# Patient Record
Sex: MALE | Race: WHITE | NOT HISPANIC OR LATINO | Employment: FULL TIME | URBAN - METROPOLITAN AREA
[De-identification: names, ages, dates, MRNs, and addresses within clinical notes are randomized per-mention and may not be internally consistent; named-entity substitution may affect disease eponyms.]

---

## 2024-01-15 ENCOUNTER — OFFICE VISIT (OUTPATIENT)
Dept: PRIMARY CARE | Facility: CLINIC | Age: 39
End: 2024-01-15
Payer: COMMERCIAL

## 2024-01-15 VITALS
SYSTOLIC BLOOD PRESSURE: 121 MMHG | BODY MASS INDEX: 27.89 KG/M2 | WEIGHT: 184 LBS | HEART RATE: 76 BPM | HEIGHT: 68 IN | DIASTOLIC BLOOD PRESSURE: 69 MMHG

## 2024-01-15 DIAGNOSIS — G47.00 INSOMNIA, UNSPECIFIED TYPE: ICD-10-CM

## 2024-01-15 DIAGNOSIS — M25.511 CHRONIC RIGHT SHOULDER PAIN: ICD-10-CM

## 2024-01-15 DIAGNOSIS — G89.29 CHRONIC RIGHT SHOULDER PAIN: ICD-10-CM

## 2024-01-15 DIAGNOSIS — J30.9 ALLERGIC RHINITIS, UNSPECIFIED SEASONALITY, UNSPECIFIED TRIGGER: ICD-10-CM

## 2024-01-15 DIAGNOSIS — F41.8 DEPRESSION WITH ANXIETY: ICD-10-CM

## 2024-01-15 DIAGNOSIS — Z00.00 ANNUAL PHYSICAL EXAM: Primary | ICD-10-CM

## 2024-01-15 PROCEDURE — 1036F TOBACCO NON-USER: CPT | Performed by: INTERNAL MEDICINE

## 2024-01-15 PROCEDURE — 99386 PREV VISIT NEW AGE 40-64: CPT | Performed by: INTERNAL MEDICINE

## 2024-01-15 RX ORDER — HYDROXYZINE HYDROCHLORIDE 25 MG/1
25 TABLET, FILM COATED ORAL NIGHTLY PRN
Qty: 30 TABLET | Refills: 1 | Status: SHIPPED | OUTPATIENT
Start: 2024-01-15 | End: 2024-03-15

## 2024-01-15 ASSESSMENT — ENCOUNTER SYMPTOMS
DEPRESSION: 0
LOSS OF SENSATION IN FEET: 0
OCCASIONAL FEELINGS OF UNSTEADINESS: 0

## 2024-01-15 NOTE — PROGRESS NOTES
"Subjective   Patient ID: Keanu Ackerman is a 38 y.o. male who presents for Annual Exam.    HPI   Patient is a 38-year-old  male who comes to establish primary care.  He would like to get an annual wellness exam and lab work.  Patient is currently not on any regular prescription medications.  He used to see psychiatry as well as psychology for his history of depression and anxiety for which she was taking Lexapro but has been off the medication for quite some time because he did not like the way it made him feel and he currently denies feeling particularly depressed or anxious.  Patient has been experiencing right shoulder pain on and off for the past few years-symptom initially started while he was playing any basketball league.  Patient has not had any imaging or orthopedic evaluation in this regard.  He does have history of allergies for which she takes over-the-counter antihistamine as needed  Last Tdap was less than 10 years ago.  Patient denies any significant family history of premature CAD or malignancy.  Pt denies, fever, chills, CP, SOB, abdominal pain, N/V/D/C or  symptoms. No HA, dizziness, numbness or weakness.  No loss of weight, loss of appetite, melena or rectal bleeding.  Patient lives with his wife and 4-year-old son.  He complains of difficulty sleeping and wakes up many times per night is agreeable to trying medication to help with this issue.  Review of Systems  As per HPI.  Patient does not do any regular exercise activity.  Mother  of leukemia at age 65. No other Fhx of malIgnancy.  Objective   /69 (BP Location: Right arm, Patient Position: Sitting, BP Cuff Size: Adult long)   Pulse 76   Ht 1.727 m (5' 8\")   Wt 83.5 kg (184 lb)   BMI 27.98 kg/m²     Physical Exam  General - well developed, well appearing, overweight, young  male in no acute respiratory distress  Eyes - normal sclera and conjunctiva with no pallor or icterus, normal extraocular movements  ENT - " normal external auditory canals and tympanic membranes, throat clear with no exudates  Neck - No JVD, thyromegaly or lymphadenopathy  Lungs - no respiratory distress and lungs clear to auscultation bilaterally  Heart - normal S1, S2 with normal heart rate, rhythm and no murmurs   Abdomen - soft, nontender with no masses or organomegaly  Extremities - no cyanosis or pedal edema  Neuro - grossly normal neuro exam with no focal neuro deficits  Psych - normal mental status, mood and affect   Skin - no rashes or ulcers  MSK - normal gait with grossly normal ROM of major joints  Assessment/Plan        1.  Annual wellness exam-routine labs ordered, patient claims he is up-to-date on immunizations  2.  History of allergic rhinitis-patient takes over-the-counter antihistamine.  3.  Chronic right shoulder pain-referral provided to orthopedics as well as physical therapy  4.  Depression with anxiety-patient does not believe he needs any regular drug treatment for this issue because his symptoms have improved and he has learned to deal with situations that trigger symptoms  5.  Chronic insomnia-hydroxyzine 25 mg nightly as needed has been prescribed  Follow-up in 3 to 6 months.  34 minutes spent rooming the patient, reviewing records, eliciting history, examining patient, counseling, coordination of care and in documentation.  This note was partially generated using the Dragon voice recognition system. There may be some incorrect words, spelling and punctuation errors that were not corrected prior to committing the note to the patient's medical record.

## 2024-01-30 ENCOUNTER — EVALUATION (OUTPATIENT)
Dept: PHYSICAL THERAPY | Facility: CLINIC | Age: 39
End: 2024-01-30
Payer: COMMERCIAL

## 2024-01-30 DIAGNOSIS — M25.511 CHRONIC RIGHT SHOULDER PAIN: Primary | ICD-10-CM

## 2024-01-30 DIAGNOSIS — G89.29 CHRONIC RIGHT SHOULDER PAIN: Primary | ICD-10-CM

## 2024-01-30 PROCEDURE — 97110 THERAPEUTIC EXERCISES: CPT | Mod: GP | Performed by: PHYSICAL THERAPIST

## 2024-01-30 PROCEDURE — 97161 PT EVAL LOW COMPLEX 20 MIN: CPT | Mod: GP | Performed by: PHYSICAL THERAPIST

## 2024-01-30 ASSESSMENT — ENCOUNTER SYMPTOMS
LOSS OF SENSATION IN FEET: 0
OCCASIONAL FEELINGS OF UNSTEADINESS: 0
DEPRESSION: 0

## 2024-01-30 NOTE — PROGRESS NOTES
"Physical Therapy Evaluation    Patient Name: Keanu Ackerman  MRN: [id number]  Today's Date: 1/30/2024  Visit: 1  Referred by: Dr. Boss  Diagnosis:   1. Chronic right shoulder pain          SUBJECTIVE:  38 y.o. english speaking male with Hx of R) shoulder pain x ~4 yrs.   States he feels it started with playing football and noticed a pop and light ache when he threw the ball.  He was able to continue playing but it ached every time he threw the ball.  It was sore for a few days after.   Then it would ache when he had to work with arms over head.  ~1 yr. Later he was playing in a basketball league and the pain occurred with over head movements.  Eventually he has to stop playing in the league.  Now pain anterior and posterior R) shoulder pain with reaching over head.  Better with activity modification.  Pain: W- 3/10, B- 0/10    Home Living:   Lives in home with wife    Prior level of function:  No limitations prior    OBJECTIVE:  Tight pect minor  Shoulders rounded   Tight in posterior capsule.  4/5 ABD with mild pain R)  4+/5 ER without pain  4/5 mid and lower trap  (+) Huang and Neer impingement  Full AROM of shoulder.  (+) crepitus with ABD and flexion above 90*.  Pain with elevation through flexion above 120 and ABD above 90.  (-) load and shift and drop sign,   (-) Push off subscap.  (+) jobes  Outcome Measure:  SPADI- 20%    ASSESSMENT:  Pt. With mild flexibility and cuff/scapular muscle weakness that needs to addressed to decreased symptoms and improve functional tolerances.    TREATMENT:  - Therex:  Door way low Pect S 30\" x 3  Prone Scap I's 5\"x10  Prone Scap T's  5\"x10  SL ER  (2/5)  1#  2x10  SL ABD (2/5)  2#  2x10    PATIENT EDUCATION:  HEP    PLAN:   Daily HEP   Follow up PT weekly x 8 weeks.  Rehab potential: good  Plan of care agreement: Y    GOALS:  Active       PT Problem       Pt will have improved pain free ROM of shoulder       Start:  01/30/24    Expected End:  04/05/24            Pt will " have improved strength of R) UE to assist with improving functional tolerances       Start:  01/30/24    Expected End:  04/05/24            Pt will have improved SPADI score by at least 15%       Start:  01/30/24    Expected End:  04/05/24            Pt will have improved reports of pain by at least 2 levels using a VAS       Start:  01/30/24    Expected End:  03/01/24            Pt will be independent with HPE       Start:  01/30/24    Expected End:  03/01/24

## 2024-02-01 ENCOUNTER — TELEPHONE (OUTPATIENT)
Dept: PHYSICAL THERAPY | Facility: CLINIC | Age: 39
End: 2024-02-01
Payer: COMMERCIAL

## 2024-02-09 ENCOUNTER — TREATMENT (OUTPATIENT)
Dept: PHYSICAL THERAPY | Facility: CLINIC | Age: 39
End: 2024-02-09
Payer: COMMERCIAL

## 2024-02-09 DIAGNOSIS — M25.511 CHRONIC RIGHT SHOULDER PAIN: Primary | ICD-10-CM

## 2024-02-09 DIAGNOSIS — G89.29 CHRONIC RIGHT SHOULDER PAIN: Primary | ICD-10-CM

## 2024-02-09 PROCEDURE — 97110 THERAPEUTIC EXERCISES: CPT | Mod: GP | Performed by: PHYSICAL THERAPIST

## 2024-02-09 NOTE — PROGRESS NOTES
"Physical Therapy Treatment    Patient Name: Keanu Ackerman  MRN: [id number]  Today's Date: 2/9/2024  Visit: 2  Referred by: Dr. Boss  Diagnosis:   1. Chronic right shoulder pain          SUBJECTIVE:  38 y.o. male who returns for his chronic R) shoulder pain.   He states that his anterior and posterior R) shoulder pain has not changed.  He feels a pop in his shoulder as he raises it above his shoulders and it hurts at that point, but above and below that point it does not hurt.   HEP- Y  Pain: 3-4/10      OBJECTIVE:  Tight pect minor  Tight in posterior capsule.  4/5 ABD with mild pain R)  4+/5 ER without pain  4/5 mid and lower trap  (+) Huang and Neer impingement  Full AROM of shoulder.   Pain with elevation through flexion above 120 and ABD above 90.   (+) crepitus      Outcome Measure:  SPADI- 20%    ASSESSMENT:  Pt with decreased popping after session and able to elevate his shoulder through abduction and flexion without pain.    TREATMENT:  - Therex:  UBE L4  x 5min.  Door way low Pect S 60\" x 2  Table roller flexion S  10\" x 10  Cross body  10\" x 10  Prone Scap I's 5\"x10  1#  Prone Scap T's  5\"x10  1#  SL ER  (2/5)  4#  3x10  SL ABD (2/5)  4#  3x10  TB G  ER walkouts  5\" x 10    Manual therapy-  R) GHJ: inferior glide in multiple angles, posterior glide in multiple angles.    PLAN:   Continue daily HEP  F/U weekly to progress pain free mobility and cuff/scapular strength.    GOALS:  Active       PT Problem       Pt will have improved pain free ROM of shoulder       Start:  01/30/24    Expected End:  04/05/24            Pt will have improved strength of R) UE to assist with improving functional tolerances       Start:  01/30/24    Expected End:  04/05/24            Pt will have improved SPADI score by at least 15%       Start:  01/30/24    Expected End:  04/05/24            Pt will have improved reports of pain by at least 2 levels using a VAS       Start:  01/30/24    Expected End:  03/01/24            Pt " will be independent with HPE       Start:  01/30/24    Expected End:  03/01/24

## 2024-02-23 ENCOUNTER — TREATMENT (OUTPATIENT)
Dept: PHYSICAL THERAPY | Facility: CLINIC | Age: 39
End: 2024-02-23
Payer: COMMERCIAL

## 2024-02-23 DIAGNOSIS — G89.29 CHRONIC RIGHT SHOULDER PAIN: ICD-10-CM

## 2024-02-23 DIAGNOSIS — M25.511 CHRONIC RIGHT SHOULDER PAIN: ICD-10-CM

## 2024-02-23 PROCEDURE — 97110 THERAPEUTIC EXERCISES: CPT | Mod: GP,CQ | Performed by: PHYSICAL THERAPY ASSISTANT

## 2024-02-23 PROCEDURE — 97140 MANUAL THERAPY 1/> REGIONS: CPT | Mod: GP,CQ | Performed by: PHYSICAL THERAPY ASSISTANT

## 2024-02-23 NOTE — PROGRESS NOTES
"Physical Therapy Treatment    Patient Name: Keanu Ackerman  MRN: [id number]  Today's Date: 2/23/2024  Visit: 3  Initial Eval: 1/30/24  Referred by: Dr. Boss  Diagnosis:   1. Chronic right shoulder pain  Follow Up In Physical Therapy        SUBJECTIVE:  Pt enters into therapy reporting no pain at rest. He states he had ortho consultation earlier this weak and the Dr was under the impression that he may have a torn labrum. He does have MRI approved. He has to call to schedule.     Pain: 3-4/10      OBJECTIVE:  Crepitus with S/L long are abd. Decreased load d/t pain.     Tight pect minor  Tight in posterior capsule.  4/5 ABD with mild pain R)  4+/5 ER without pain  4/5 mid and lower trap  (+) Huang and Neer impingement  Full AROM of shoulder.   Pain with elevation through flexion above 120 and ABD above 90.   (+) crepitus      Outcome Measure:  SPADI- 20%    ASSESSMENT:  Responded well to exercises dosage for the most part. Pain remained contained and manageable but intermittently catches with long arc movements. Doing well with HEP.     TREATMENT:  - Therex:  UBE 3'/3', L5  Rows 5# 3x10  Shld ext 2.5# 3x10  Door way low Pect S 60\" x 2  Table roller flexion S  10\" x 10  Cross body  10\" x 10  Prone Scap I's 5\"x10  1#  Prone Scap T's  5\"x10  1#  SL ER  (2/5)  4#  2x10  SL ABD (2/5)  1#  2x10   TB G  ER walkouts  5\" x 10    MANUAL:    PLAN:   Continue daily HEP  F/U weekly to progress pain free mobility and cuff/scapular strength.    GOALS:  Active       PT Problem       Pt will have improved pain free ROM of shoulder       Start:  01/30/24    Expected End:  04/05/24            Pt will have improved strength of R) UE to assist with improving functional tolerances       Start:  01/30/24    Expected End:  04/05/24            Pt will have improved SPADI score by at least 15%       Start:  01/30/24    Expected End:  04/05/24            Pt will have improved reports of pain by at least 2 levels using a VAS       Start:  " 01/30/24    Expected End:  03/01/24            Pt will be independent with HPE       Start:  01/30/24    Expected End:  03/01/24

## 2024-03-11 ENCOUNTER — APPOINTMENT (OUTPATIENT)
Dept: PHYSICAL THERAPY | Facility: CLINIC | Age: 39
End: 2024-03-11
Payer: COMMERCIAL

## 2024-03-18 ENCOUNTER — APPOINTMENT (OUTPATIENT)
Dept: PHYSICAL THERAPY | Facility: CLINIC | Age: 39
End: 2024-03-18
Payer: COMMERCIAL

## 2024-03-25 ENCOUNTER — APPOINTMENT (OUTPATIENT)
Dept: PHYSICAL THERAPY | Facility: CLINIC | Age: 39
End: 2024-03-25
Payer: COMMERCIAL

## 2024-09-25 ENCOUNTER — HOSPITAL ENCOUNTER (OUTPATIENT)
Dept: RADIOLOGY | Facility: CLINIC | Age: 39
Discharge: HOME | End: 2024-09-25
Payer: COMMERCIAL

## 2024-09-25 ENCOUNTER — OFFICE VISIT (OUTPATIENT)
Dept: PRIMARY CARE | Facility: CLINIC | Age: 39
End: 2024-09-25
Payer: COMMERCIAL

## 2024-09-25 VITALS
DIASTOLIC BLOOD PRESSURE: 79 MMHG | WEIGHT: 180 LBS | SYSTOLIC BLOOD PRESSURE: 122 MMHG | HEIGHT: 68 IN | BODY MASS INDEX: 27.28 KG/M2 | HEART RATE: 91 BPM

## 2024-09-25 DIAGNOSIS — R05.1 ACUTE COUGH: ICD-10-CM

## 2024-09-25 DIAGNOSIS — R06.2 WHEEZING: ICD-10-CM

## 2024-09-25 DIAGNOSIS — R05.1 ACUTE COUGH: Primary | ICD-10-CM

## 2024-09-25 DIAGNOSIS — J45.20 MILD INTERMITTENT CHILDHOOD ASTHMA WITHOUT COMPLICATION (HHS-HCC): ICD-10-CM

## 2024-09-25 PROBLEM — R10.9 ABDOMINAL PAIN: Status: RESOLVED | Noted: 2024-09-25 | Resolved: 2024-09-25

## 2024-09-25 PROBLEM — R11.2 NAUSEA AND VOMITING: Status: RESOLVED | Noted: 2024-09-25 | Resolved: 2024-09-25

## 2024-09-25 PROCEDURE — 71046 X-RAY EXAM CHEST 2 VIEWS: CPT

## 2024-09-25 PROCEDURE — 71046 X-RAY EXAM CHEST 2 VIEWS: CPT | Performed by: RADIOLOGY

## 2024-09-25 PROCEDURE — 3008F BODY MASS INDEX DOCD: CPT | Performed by: INTERNAL MEDICINE

## 2024-09-25 PROCEDURE — 99213 OFFICE O/P EST LOW 20 MIN: CPT | Performed by: INTERNAL MEDICINE

## 2024-09-25 PROCEDURE — 1036F TOBACCO NON-USER: CPT | Performed by: INTERNAL MEDICINE

## 2024-09-25 RX ORDER — AZITHROMYCIN 250 MG/1
TABLET, FILM COATED ORAL
Qty: 6 TABLET | Refills: 0 | Status: SHIPPED | OUTPATIENT
Start: 2024-09-25 | End: 2024-09-30

## 2024-09-25 RX ORDER — ALBUTEROL SULFATE 90 UG/1
2 INHALANT RESPIRATORY (INHALATION) EVERY 4 HOURS PRN
Qty: 8 G | Refills: 1 | Status: SHIPPED | OUTPATIENT
Start: 2024-09-25 | End: 2025-09-25

## 2024-09-25 RX ORDER — METHYLPREDNISOLONE 4 MG/1
TABLET ORAL
Qty: 21 TABLET | Refills: 0 | Status: SHIPPED | OUTPATIENT
Start: 2024-09-25 | End: 2024-10-02

## 2024-09-25 RX ORDER — BENZONATATE 100 MG/1
100 CAPSULE ORAL 3 TIMES DAILY PRN
Qty: 30 CAPSULE | Refills: 0 | Status: SHIPPED | OUTPATIENT
Start: 2024-09-25 | End: 2024-10-02

## 2024-09-25 NOTE — PROGRESS NOTES
"Subjective   Patient ID: Keanu Ackerman is a 39 y.o. male who presents for Cough and Wheezing.    JONA Ackerman is a 39-year-old  male who comes today for an acute visit.  Patient complains of more than 1 week history of cough with yellow phlegm production associated with shortness of breath, wheezing and intermittent fever.  He was exposed to a sick contact prior to symptom onset.  Patient does have history of childhood asthma and had an old albuterol inhaler which she has been using but this does not seem to relieve the wheezing/shortness of breath.  Cough was quite severe last night and did interfere with sleep.  No history of chest pain, abdominal pain, nausea, vomiting, diarrhea or genitourinary symptoms.  Review of Systems  As per HPI.  Objective   /79 (BP Location: Right arm, Patient Position: Sitting, BP Cuff Size: Large adult)   Pulse 91   Ht 1.727 m (5' 8\")   Wt 81.6 kg (180 lb)   BMI 27.37 kg/m²     Physical Exam  General - well developed, well appearing, overweight, young  male in no acute respiratory distress  Eyes - normal sclera and conjunctiva with no pallor or icterus, normal extraocular movements  ENT - throat clear with no exudates  Neck - No JVD, thyromegaly or lymphadenopathy  Lungs - no respiratory distress and lungs with decreased air entry bilaterally and occasional expiratory wheezing heard  Heart - normal S1, S2 with normal heart rate, rhythm and no murmurs   Abdomen - soft, nontender with no masses or organomegaly  Extremities - no cyanosis or pedal edema  Neuro - grossly normal neuro exam with no focal neuro deficits  Psych - normal mental status, mood and affect   Skin - no rashes or ulcers  MSK - normal gait with grossly normal ROM of major joints     Assessment/Plan     1.  Acute cough associated with shortness of breath, wheezing, intermittent fevers-patient has tested negative for COVID, chest x-ray will be ordered, empiric antibiotic therapy with Z-Martín for " 5 days will be provided and Tessalon Perles will be prescribed for cough, chest x-ray will be ordered  2.  History of childhood asthma with wheezing and shortness of breath currently-suspect exacerbation due to above respiratory illness, prescriptions provided for albuterol inhaler as well as Medrol Dosepak  Patient is to contact me if symptoms do not improve with above treatment.  Follow-up as advised during previous appointment.  20 minutes spent rooming the patient, reviewing records, eliciting history, examining patient, counseling, coordination of care and in documentation.  This note was partially generated using the Dragon voice recognition system. There may be some incorrect words, spelling and punctuation errors that were not corrected prior to committing the note to the patient's medical record.

## 2025-03-12 ENCOUNTER — OFFICE VISIT (OUTPATIENT)
Dept: URGENT CARE | Age: 40
End: 2025-03-12
Payer: COMMERCIAL

## 2025-03-12 VITALS
SYSTOLIC BLOOD PRESSURE: 134 MMHG | TEMPERATURE: 98.3 F | DIASTOLIC BLOOD PRESSURE: 79 MMHG | HEART RATE: 55 BPM | RESPIRATION RATE: 16 BRPM | OXYGEN SATURATION: 96 %

## 2025-03-12 DIAGNOSIS — J02.9 SORE THROAT: Primary | ICD-10-CM

## 2025-03-12 LAB — POC RAPID STREP: NEGATIVE

## 2025-03-12 PROCEDURE — 87880 STREP A ASSAY W/OPTIC: CPT

## 2025-03-12 PROCEDURE — 99203 OFFICE O/P NEW LOW 30 MIN: CPT

## 2025-03-12 ASSESSMENT — ENCOUNTER SYMPTOMS: SORE THROAT: 1

## 2025-03-12 NOTE — PROGRESS NOTES
"Subjective   Patient ID: Keanu Ackerman is a 39 y.o. male. They present today with a chief complaint of Sore Throat (Pt presents with a slight sore throat wants to get checked for strep said his son and wife have it. Throat started to be tender 4 days ago.  ).    History of Present Illness  Patient is a 39-year-old male with no reported past medical history who presents urgent care today with a complaint of sore throat.  Patient states he has had \"very slight sore throat\" for approximately 4 days.  He states he went to work today and would not have thought anything of it except that his wife and son were recently diagnosed with strep throat.  He notes they are both \"much sicker\" then heat.  He denies any other symptoms or complaints including fevers, chills, nausea, vomiting, chest pain or shortness of breath.      History provided by:  Patient  Sore Throat         Past Medical History  Allergies as of 03/12/2025 - Reviewed 03/12/2025   Allergen Reaction Noted    Animal dander Unknown 01/15/2024       (Not in a hospital admission)         Past Medical History:   Diagnosis Date    Abdominal pain 09/25/2024    Nausea and vomiting 09/25/2024       No past surgical history on file.     reports that he has never smoked. He has never used smokeless tobacco. He reports current alcohol use. He reports that he does not use drugs.    Review of Systems  Review of Systems   HENT:  Positive for sore throat.                                   Objective    Vitals:    03/12/25 1215   BP: 134/79   BP Location: Left arm   Patient Position: Sitting   Pulse: 55   Resp: 16   Temp: 36.8 °C (98.3 °F)   SpO2: 96%     No LMP for male patient.    Physical Exam  Vitals and nursing note reviewed.   Constitutional:       General: He is not in acute distress.     Appearance: Normal appearance. He is not ill-appearing, toxic-appearing or diaphoretic.   HENT:      Head: Normocephalic and atraumatic.      Nose: Nose normal.      Mouth/Throat:      " Mouth: Mucous membranes are moist.      Pharynx: Posterior oropharyngeal erythema present. No oropharyngeal exudate.   Eyes:      Extraocular Movements: Extraocular movements intact.      Conjunctiva/sclera: Conjunctivae normal.      Pupils: Pupils are equal, round, and reactive to light.   Cardiovascular:      Rate and Rhythm: Normal rate and regular rhythm.      Pulses: Normal pulses.      Heart sounds: Normal heart sounds.   Pulmonary:      Effort: Pulmonary effort is normal. No respiratory distress.      Breath sounds: Normal breath sounds. No stridor. No wheezing, rhonchi or rales.   Chest:      Chest wall: No tenderness.   Musculoskeletal:         General: Normal range of motion.      Cervical back: Normal range of motion and neck supple.   Skin:     General: Skin is warm and dry.      Capillary Refill: Capillary refill takes less than 2 seconds.   Neurological:      General: No focal deficit present.      Mental Status: He is alert and oriented to person, place, and time.   Psychiatric:         Mood and Affect: Mood normal.         Behavior: Behavior normal.         Procedures      Assessment/Plan   Allergies, medications, history, and pertinent labs/EKGs/Imaging reviewed by ADRIANO Calhoun.     Medical Decision Making    Patient is well appearing, afebrile, non toxic, not hypoxic, and appropriate for outpatient treatment and management at time of evaluation. Patient presents with sore throat x 4 days.     Differential includes but not limited to: Streptococcal pharyngitis, viral pharyngitis, postnasal drip, other    On exam, patient has mild bilateral erythema of the oropharynx without appreciable tonsillar swelling or exudate.  Exam otherwise unremarkable.    Rapid strep is negative.  Offered to obtain culture for PCR but patient declined.  Suspect symptoms are viral in nature.  Recommended continued use of over-the-counter medication for symptom relief and close follow-up with PCP.  Red flags and  ER precautions discussed.  Patient voices understanding and is agreeable to this plan.  He was discharged in stable condition.       Dictation software was used in the creation of this note which does not evaluate or correct for typographical, spelling, syntax or grammatical errors.    Orders and Diagnoses  Diagnoses and all orders for this visit:  Sore throat  -     POCT rapid strep A manually resulted      Medical Admin Record      Follow Up Instructions  No follow-ups on file.    Patient disposition: Home    Electronically signed by ADRIANO Calhoun  12:33 PM

## 2025-03-12 NOTE — PATIENT INSTRUCTIONS
You were seen at Urgent Care today for a sore throat.  Your rapid strep was negative.  Please treat as discussed.  Monitor for red flags which we spoke about, If your symptoms change, worsen or become concerning in any way, please go to the emergency room immediately, otherwise you can followup with your PCP in 2-3 days as needed